# Patient Record
Sex: MALE | Race: WHITE | NOT HISPANIC OR LATINO | Employment: PART TIME | URBAN - METROPOLITAN AREA
[De-identification: names, ages, dates, MRNs, and addresses within clinical notes are randomized per-mention and may not be internally consistent; named-entity substitution may affect disease eponyms.]

---

## 2017-01-03 ENCOUNTER — DOCUMENTATION ONLY (OUTPATIENT)
Dept: TRANSPLANT | Facility: CLINIC | Age: 32
End: 2017-01-03

## 2017-01-03 ENCOUNTER — PATIENT MESSAGE (OUTPATIENT)
Dept: TRANSPLANT | Facility: CLINIC | Age: 32
End: 2017-01-03

## 2017-01-03 LAB
EXT ALBUMIN: 4
EXT BK VIRUS DNA QN PCR: NEGATIVE
EXT BUN: 19
EXT CALCIUM: 9
EXT CHLORIDE: 105
EXT CO2: 26
EXT CREATININE: 1.3 MG/DL
EXT EOSINOPHIL%: 2
EXT GFR MDRD AF AMER: 67
EXT GLUCOSE: 94
EXT HEMATOCRIT: 47.3
EXT HEMOGLOBIN: 16
EXT LYMPH%: 17.7
EXT MAGNESIUM: 2.1
EXT MONOCYTES%: 6
EXT PHOSPHORUS: 2.1 (ref 2.5–4.9)
EXT PLATELETS: 194
EXT POTASSIUM: 4.7
EXT SEGS%: 73.3
EXT SODIUM: 138 MMOL/L
EXT TACROLIMUS LVL: 7.4
EXT WBC: 7

## 2017-01-17 ENCOUNTER — DOCUMENTATION ONLY (OUTPATIENT)
Dept: TRANSPLANT | Facility: CLINIC | Age: 32
End: 2017-01-17

## 2017-01-17 LAB
EXT ALBUMIN: 4.5
EXT ANC: NORMAL
EXT BUN: 17
EXT CALCIUM: 9.2
EXT CHLORIDE: 104
EXT CO2: 27
EXT CREATININE: 1.2 MG/DL
EXT EOSINOPHIL%: 3.4
EXT GFR MDRD NON AF AMER: 73
EXT GLUCOSE UA: NORMAL
EXT GLUCOSE: 96
EXT HEMATOCRIT: 47.1
EXT HEMOGLOBIN: 15.7
EXT LYMPH%: 16.7
EXT MAGNESIUM: 2
EXT MONOCYTES%: 7.4
EXT NITRITES UA: NORMAL
EXT PHOSPHORUS: 2
EXT PLATELETS: 202
EXT POTASSIUM: 5.2
EXT PROT/CREAT RATIO UR: 0.13
EXT PROTEIN UA: NORMAL
EXT RBC UA: NORMAL
EXT SEGS%: 71.5
EXT SODIUM: 139 MMOL/L
EXT TACROLIMUS LVL: 8.3
EXT URIC ACID: 6.3
EXT WBC UA: NORMAL
EXT WBC: 7.1

## 2017-01-20 ENCOUNTER — TELEPHONE (OUTPATIENT)
Dept: TRANSPLANT | Facility: CLINIC | Age: 32
End: 2017-01-20

## 2017-01-20 ENCOUNTER — DOCUMENTATION ONLY (OUTPATIENT)
Dept: TRANSPLANT | Facility: CLINIC | Age: 32
End: 2017-01-20

## 2017-03-16 ENCOUNTER — DOCUMENTATION ONLY (OUTPATIENT)
Dept: TRANSPLANT | Facility: CLINIC | Age: 32
End: 2017-03-16

## 2017-03-16 LAB
EXT ALBUMIN: 3.8
EXT BK VIRUS DNA QUANT, PCR, URINE: ABNORMAL
EXT BUN: 16
EXT CALCIUM: 8.8
EXT CHLORIDE: 107
EXT CO2: 26
EXT CREATININE: 1.2 MG/DL
EXT EOSINOPHIL%: 6.6
EXT GFR MDRD NON AF AMER: 73
EXT GLUCOSE: 94
EXT HEMATOCRIT: 43
EXT HEMOGLOBIN: 14.4
EXT LYMPH%: 13.4
EXT MAGNESIUM: 1.9
EXT MONOCYTES%: 10
EXT PHOSPHORUS: 2
EXT PLATELETS: 176
EXT POTASSIUM: 4.8
EXT SEGS%: 68.9
EXT SODIUM: 142 MMOL/L
EXT TACROLIMUS LVL: 8.2
EXT WBC: 6.3

## 2017-03-21 ENCOUNTER — DOCUMENTATION ONLY (OUTPATIENT)
Dept: TRANSPLANT | Facility: CLINIC | Age: 32
End: 2017-03-21

## 2017-03-22 NOTE — TELEPHONE ENCOUNTER
Call placedbon reviewed by Dr. Peters. Patient reports adherence to KPN. Dose increased to 4 tabs tid. Patient states understanding.

## 2017-03-23 RX ORDER — FAMOTIDINE 20 MG/1
TABLET, FILM COATED ORAL
Qty: 30 TABLET | Refills: 5 | Status: SHIPPED | OUTPATIENT
Start: 2017-03-23

## 2017-03-24 RX ORDER — FAMOTIDINE 20 MG/1
TABLET, FILM COATED ORAL
Qty: 30 TABLET | OUTPATIENT
Start: 2017-03-24

## 2017-06-22 ENCOUNTER — DOCUMENTATION ONLY (OUTPATIENT)
Dept: TRANSPLANT | Facility: CLINIC | Age: 32
End: 2017-06-22

## 2017-06-22 ENCOUNTER — TELEPHONE (OUTPATIENT)
Dept: TRANSPLANT | Facility: CLINIC | Age: 32
End: 2017-06-22

## 2017-06-22 LAB
EXT ANC: ABNORMAL
EXT BK VIRUS DNA QN PCR: ABNORMAL
EXT BUN: 17
EXT CALCIUM: 9.4
EXT CHLORIDE: 107
EXT CO2: 28
EXT CREATININE: 1.3 MG/DL
EXT EOSINOPHIL%: 3.3
EXT GFR MDRD NON AF AMER: 67
EXT GLUCOSE: 93
EXT HEMATOCRIT: 46.6
EXT HEMOGLOBIN: 15.4
EXT LYMPH%: 17.5
EXT MAGNESIUM: 2.1
EXT MONOCYTES%: 5.7
EXT PHOSPHORUS: 1.9
EXT PLATELETS: 189
EXT POTASSIUM: 4.8
EXT PROT/CREAT RATIO UR: 0.1
EXT SEGS%: 72.6
EXT SODIUM: 142 MMOL/L
EXT TACROLIMUS LVL: 8.5
EXT URIC ACID: 7.2
EXT VIT D 25 HYDROXY: 24.8
EXT WBC: 7.5

## 2017-06-22 NOTE — TELEPHONE ENCOUNTER
Call placed, labs reviewed by Dr. Arreola. Patient reports taking Prograf twice on evening prior to lab draw. Will repeat before changing dose.

## 2017-06-22 NOTE — PROGRESS NOTES
Results reviewed, and action is needed: Prior message sent in error: please decrease tacrolimus to 2 mg twice daily. Target level for tacro is ~ 6.

## 2017-09-26 ENCOUNTER — DOCUMENTATION ONLY (OUTPATIENT)
Dept: TRANSPLANT | Facility: CLINIC | Age: 32
End: 2017-09-26

## 2017-09-26 LAB
EXT BK VIRUS DNA QN PCR: ABNORMAL
EXT BUN: 17
EXT CALCIUM: 9.3
EXT CHLORIDE: 106
EXT CO2: 27
EXT CREATININE: 1.2 MG/DL
EXT EOSINOPHIL%: 2.5
EXT GFR MDRD NON AF AMER: 73
EXT GLUCOSE: 90
EXT HEMATOCRIT: 46.6
EXT HEMOGLOBIN: 15.5
EXT LYMPH%: 17.5
EXT MAGNESIUM: 2
EXT MONOCYTES%: 5.9
EXT PHOSPHORUS: 2.1 (ref 2.5–4.9)
EXT PLATELETS: 192
EXT POTASSIUM: 4.7
EXT SEGS%: 73.1
EXT SODIUM: 138 MMOL/L
EXT TACROLIMUS LVL: 5.6
EXT WBC: 8.1

## 2017-10-02 ENCOUNTER — DOCUMENTATION ONLY (OUTPATIENT)
Dept: TRANSPLANT | Facility: CLINIC | Age: 32
End: 2017-10-02

## 2017-10-02 NOTE — PROGRESS NOTES
Let's lower MMF to 500 bid and continue with serum BK as per protocol    Add cylex every two weeks as well please

## 2017-10-03 RX ORDER — MYCOPHENOLATE MOFETIL 250 MG/1
500 CAPSULE ORAL 2 TIMES DAILY
Qty: 120 CAPSULE | Refills: 11 | Status: SHIPPED | OUTPATIENT
Start: 2017-10-03 | End: 2017-10-20 | Stop reason: SDUPTHER

## 2017-10-03 NOTE — TELEPHONE ENCOUNTER
Call placed, labs reviewed by Dr. Peters. MMF dose decreased to 500 bid. Will continue q2wk labs BK monitoring.

## 2017-10-09 ENCOUNTER — TELEPHONE (OUTPATIENT)
Dept: TRANSPLANT | Facility: CLINIC | Age: 32
End: 2017-10-09

## 2017-10-09 NOTE — TELEPHONE ENCOUNTER
----- Message from Rob Quintanilla sent at 10/9/2017  9:50 AM CDT -----  Contact: PT  Need a call this morning regarding his labs, call

## 2017-10-11 ENCOUNTER — TELEPHONE (OUTPATIENT)
Dept: TRANSPLANT | Facility: CLINIC | Age: 32
End: 2017-10-11

## 2017-10-11 NOTE — TELEPHONE ENCOUNTER
----- Message from Noelle Chambers sent at 10/11/2017  3:11 PM CDT -----  Contact: Pt  Dora    Pt states the doctor in [place] denied his lab work that was scheduled for today    Pt contact number [phone]  Thanks

## 2017-10-11 NOTE — TELEPHONE ENCOUNTER
Call placed to Dr. Ahuja office. Patient lives in [place] and lab orders must be cosigned by his Primary Nephrologist. Dr. Knott not in office today. Will complete labs when order signed.

## 2017-10-17 ENCOUNTER — DOCUMENTATION ONLY (OUTPATIENT)
Dept: TRANSPLANT | Facility: CLINIC | Age: 32
End: 2017-10-17

## 2017-10-17 LAB
EXT ALBUMIN: 4
EXT ALKALINE PHOSPHATASE: 88
EXT ALT: 24
EXT ANC: ABNORMAL
EXT AST: 13
EXT BILIRUBIN TOTAL: 0.4
EXT BK VIRUS DNA QN PCR: 470
EXT BUN: 14
EXT CALCIUM: 9.1
EXT CHLORIDE: 106
EXT CO2: 28
EXT CREATININE: 1.3 MG/DL
EXT EOSINOPHIL%: 2.6
EXT GFR MDRD NON AF AMER: 66
EXT GLUCOSE UA: ABNORMAL
EXT GLUCOSE: 94
EXT HEMATOCRIT: 47.4
EXT HEMOGLOBIN: 15.8
EXT LYMPH%: 12.2
EXT MAGNESIUM: 1.9
EXT MONOCYTES%: 6.7
EXT NITRITES UA: ABNORMAL
EXT PHOSPHORUS: 1.8
EXT PLATELETS: 212
EXT POTASSIUM: 4.9
EXT PROT/CREAT RATIO UR: 0.1
EXT PROTEIN TOTAL: 7.8
EXT PROTEIN UA: ABNORMAL
EXT SEGS%: 78.3
EXT SODIUM: 138 MMOL/L
EXT TACROLIMUS LVL: 6.6
EXT WBC: 5.44

## 2017-10-20 ENCOUNTER — DOCUMENTATION ONLY (OUTPATIENT)
Dept: TRANSPLANT | Facility: CLINIC | Age: 32
End: 2017-10-20

## 2017-10-20 NOTE — TELEPHONE ENCOUNTER
Call placed, labs reviewed by Dr. Peters. 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 Patient states understanding.

## 2017-10-20 NOTE — PROGRESS NOTES
-please lower MMF to 250 bid   Serum BK as per protocol, labs every 2 weeks for now  cylex every 2 weeks

## 2017-10-21 RX ORDER — MYCOPHENOLATE MOFETIL 250 MG/1
250 CAPSULE ORAL 2 TIMES DAILY
Qty: 60 CAPSULE | Refills: 11 | Status: SHIPPED | OUTPATIENT
Start: 2017-10-21 | End: 2018-11-08 | Stop reason: SDUPTHER

## 2017-11-06 ENCOUNTER — DOCUMENTATION ONLY (OUTPATIENT)
Dept: TRANSPLANT | Facility: CLINIC | Age: 32
End: 2017-11-06

## 2017-11-06 LAB
EXT ALBUMIN: 4.1
EXT ALKALINE PHOSPHATASE: 97
EXT ALT: 24
EXT AST: 14
EXT BILIRUBIN TOTAL: 0.5
EXT BK VIRUS DNA QN PCR: NORMAL
EXT BUN: 17
EXT CALCIUM: 9.2
EXT CHLORIDE: 105
EXT CO2: 26
EXT CREATININE: 1.2 MG/DL
EXT EOSINOPHIL%: 2.3
EXT GFR MDRD NON AF AMER: 73
EXT GLUCOSE UA: NORMAL
EXT GLUCOSE: 81
EXT HEMATOCRIT: 50
EXT HEMOGLOBIN: 16.8
EXT LYMPH%: 18.1
EXT MAGNESIUM: 1.9
EXT MONOCYTES%: 5.2
EXT NITRITES UA: NORMAL
EXT PHOSPHORUS: 1.7
EXT PLATELETS: 192
EXT POTASSIUM: 4.6
EXT PROT/CREAT RATIO UR: 0.12
EXT PROTEIN TOTAL: 8.3
EXT PROTEIN UA: NORMAL
EXT PTH, INTACT: 116
EXT SEGS%: 73.2
EXT SODIUM: 138 MMOL/L
EXT TACROLIMUS LVL: 4.5
EXT WBC UA: NORMAL
EXT WBC: 9.6

## 2017-11-17 ENCOUNTER — DOCUMENTATION ONLY (OUTPATIENT)
Dept: TRANSPLANT | Facility: CLINIC | Age: 32
End: 2017-11-17

## 2017-11-17 LAB
EXT BK VIRUS DNA QN PCR: NORMAL
EXT BUN: 19
EXT CALCIUM: 9.4
EXT CHLORIDE: 107
EXT CO2: 25
EXT CREATININE: 1.3 MG/DL
EXT GFR MDRD NON AF AMER: 66
EXT GLUCOSE: 103
EXT POTASSIUM: 4.7
EXT SODIUM: 139 MMOL/L
EXT TACROLIMUS LVL: 5.9

## 2017-11-30 RX ORDER — TACROLIMUS 1 MG/1
CAPSULE ORAL
Qty: 150 CAPSULE | Refills: 1 | Status: SHIPPED | OUTPATIENT
Start: 2017-11-30 | End: 2017-12-27 | Stop reason: SDUPTHER

## 2017-12-12 ENCOUNTER — TELEPHONE (OUTPATIENT)
Dept: TRANSPLANT | Facility: CLINIC | Age: 32
End: 2017-12-12

## 2017-12-12 NOTE — TELEPHONE ENCOUNTER
"LYLY received a message indicating that pt wanted information regarding insurance. Pt is 35 months post transplant and reports that he will be loosing his insurance at the end of the month. Pt reports wanting more information about the Marketplace and "Obamacare" Insurance and what he should look for in an insurance. Pt also reports that he is currently self-employed.    LYLY explained that if pt wanted to sign up for insurance through the Marketplace the the deadline for open enrollment is Dec. 15th for insurance to be active Jan. 1. LYLY also explained that if pt has any qualifying events like marriage, having a baby, changing/loosing jobs, pt will qualify for a special enrollment period and pt should sign up for insurance as soon as possible. Patient verbalized understanding and agreement. LYLY explained that pt should look for insurances that are accepted by his current providers in [place], accepted by [facility], and covers pt's medications and things like that. Patient verbalized understanding and agreement. LYLY also emailed a list of insurances that [facility] currently accepts to [email or web address] at pt's request.    LYLY remains available to pt, pt's family, and transplant team at [phone].    "

## 2017-12-27 ENCOUNTER — OFFICE VISIT (OUTPATIENT)
Dept: TRANSPLANT | Facility: CLINIC | Age: 32
End: 2017-12-27
Payer: MEDICARE

## 2017-12-27 ENCOUNTER — DOCUMENTATION ONLY (OUTPATIENT)
Dept: TRANSPLANT | Facility: CLINIC | Age: 32
End: 2017-12-27

## 2017-12-27 VITALS
OXYGEN SATURATION: 97 % | DIASTOLIC BLOOD PRESSURE: 81 MMHG | RESPIRATION RATE: 16 BRPM | HEIGHT: 74 IN | BODY MASS INDEX: 27.47 KG/M2 | TEMPERATURE: 98 F | WEIGHT: 214.06 LBS | HEART RATE: 78 BPM | SYSTOLIC BLOOD PRESSURE: 119 MMHG

## 2017-12-27 DIAGNOSIS — N02.B9 IGA NEPHROPATHY: Chronic | ICD-10-CM

## 2017-12-27 DIAGNOSIS — N25.81 SECONDARY HYPERPARATHYROIDISM OF RENAL ORIGIN: Chronic | ICD-10-CM

## 2017-12-27 DIAGNOSIS — N18.2 CKD (CHRONIC KIDNEY DISEASE), STAGE II: Chronic | ICD-10-CM

## 2017-12-27 DIAGNOSIS — N18.2 ANEMIA OF CHRONIC RENAL FAILURE, STAGE 2 (MILD): Chronic | ICD-10-CM

## 2017-12-27 DIAGNOSIS — B34.8 BK VIREMIA: ICD-10-CM

## 2017-12-27 DIAGNOSIS — Z94.0 KIDNEY REPLACED BY TRANSPLANT: Primary | ICD-10-CM

## 2017-12-27 DIAGNOSIS — Z29.89 NEED FOR PROPHYLACTIC IMMUNOTHERAPY: Chronic | ICD-10-CM

## 2017-12-27 DIAGNOSIS — I15.1 HYPERTENSION SECONDARY TO OTHER RENAL DISORDERS (CODE): Chronic | ICD-10-CM

## 2017-12-27 DIAGNOSIS — E83.39 HYPOPHOSPHATEMIA: ICD-10-CM

## 2017-12-27 DIAGNOSIS — D63.1 ANEMIA OF CHRONIC RENAL FAILURE, STAGE 2 (MILD): Chronic | ICD-10-CM

## 2017-12-27 DIAGNOSIS — Z94.0 S/P LIVING-DONOR KIDNEY TRANSPLANTATION: Primary | ICD-10-CM

## 2017-12-27 LAB
EXT ALBUMIN: 4.1
EXT ALKALINE PHOSPHATASE: 88
EXT ALT: 28
EXT AST: 16
EXT BILIRUBIN DIRECT: 0.1 MG/DL
EXT BILIRUBIN TOTAL: 0.5
EXT BK VIRUS DNA QN PCR: NORMAL
EXT BUN: 26
EXT CALCIUM: 9.3
EXT CHLORIDE: 106
EXT CO2: 26
EXT CREATININE: 1.6 MG/DL
EXT EOSINOPHIL%: 3.3
EXT GFR MDRD NON AF AMER: 52
EXT GLUCOSE UA: NORMAL
EXT GLUCOSE: 99
EXT HEMATOCRIT: 48.6
EXT HEMOGLOBIN: 16.3
EXT IMMUNKNOW (STIMULATED): NORMAL
EXT LYMPH%: 15.3
EXT MAGNESIUM: 2
EXT MONOCYTES%: 6.4
EXT NITRITES UA: NORMAL
EXT PHOSPHORUS: 1.9
EXT PLATELETS: 205
EXT POTASSIUM: 4.7
EXT PROT/CREAT RATIO UR: 0.08
EXT PROTEIN TOTAL: 7.9
EXT PROTEIN UA: NORMAL
EXT RBC UA: NORMAL
EXT SEGS%: 74.1
EXT SODIUM: 140 MMOL/L
EXT TACROLIMUS LVL: 7
EXT WBC UA: NORMAL
EXT WBC: 8.2

## 2017-12-27 PROCEDURE — 99214 OFFICE O/P EST MOD 30 MIN: CPT | Mod: PBBFAC | Performed by: NURSE PRACTITIONER

## 2017-12-27 PROCEDURE — 99999 PR PBB SHADOW E&M-EST. PATIENT-LVL IV: CPT | Mod: PBBFAC,,, | Performed by: NURSE PRACTITIONER

## 2017-12-27 PROCEDURE — 99214 OFFICE O/P EST MOD 30 MIN: CPT | Mod: S$PBB,,, | Performed by: NURSE PRACTITIONER

## 2017-12-27 RX ORDER — TACROLIMUS 1 MG/1
CAPSULE ORAL
Qty: 150 CAPSULE | Refills: 11 | Status: SHIPPED | OUTPATIENT
Start: 2017-12-27 | End: 2019-01-04 | Stop reason: SDUPTHER

## 2017-12-27 NOTE — LETTER
December 27, 2017        Triston Knott  [address]  [facility] [address]  Phone: [phone]  Fax: [phone]             [facility]  [address]  [facility] [address]  Phone: [phone]   Patient: Dane Brand   MR Number: [id number]   YOB: 1985   Date of Visit: 12/27/2017       Dear Dr. Triston Knott    Thank you for referring Dane Brand to me for evaluation. Attached you will find relevant portions of my assessment and plan of care.    If you have questions, please do not hesitate to call me. I look forward to following Dane Brand along with you.    Sincerely,    Ruth Stack, NP    Enclosure    If you would like to receive this communication electronically, please contact [email or web address] or [phone] to request [employer] access.    [employer] is a tool which provides read-only access to select patient information with whom you have a relationship. Its easy to use and provides real time access to review your patients record including encounter summaries, notes, results, and demographic information.    If you feel you have received this communication in error or would no longer like to receive these types of communications, please e-mail [email or web address]

## 2017-12-27 NOTE — PROGRESS NOTES
Kidney Post-Transplant Assessment    Referring Physician: Triston Knott  Current Nephrologist: Triston Knott    ORGAN: LEFT KIDNEY  Donor Type: living  PHS Increased Risk: no  Cold Ischemia: 40 mins  Induction Medications: campath - alemtuzumab (anti-cd52)    Subjective:     CC:  Reassessment of renal allograft function and management of chronic immunosuppression.    HPI:  Mr. Brand is a 32 y.o. year old White male who received a living kidney transplant on 12/17/14.  He has CKD stage 2 - GFR 60-89 and his baseline creatinine is between 1.2-1.4. He takes mycophenolate mofetil and tacrolimus for maintenance immunosuppression. He denies any recent hospitalizations or ER visits since his previous clinic visit.    Reports his nephrologist told him he will need a parathyroidectomy in the future  Follows up with general nephrologist every 3 months  Overall feels well. No health concerns today.   Denies chest pain, SOB, leg pain, abdominal pain or LUTs.      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   HENT: Negative for congestion, facial swelling, postnasal drip, rhinorrhea, sinus pressure, sore throat and trouble swallowing.    Eyes:  "Negative for pain, redness and visual disturbance.   Respiratory: Negative for cough, chest tightness, shortness of breath and wheezing.    Cardiovascular: Negative.  Negative for chest pain, palpitations and leg swelling.   Gastrointestinal: Negative for abdominal pain, diarrhea, nausea and vomiting.   Genitourinary: Negative for dysuria, flank pain and urgency.   Musculoskeletal: Negative for gait problem, neck pain and neck stiffness.   Skin: Negative for rash.   Allergic/Immunologic: Positive for immunocompromised state. Negative for environmental allergies and food allergies.   Neurological: Negative for dizziness, weakness, light-headedness and headaches.   Psychiatric/Behavioral: Negative for agitation and confusion. The patient is not nervous/anxious.        Objective:   Blood pressure 119/81, pulse 78, temperature 97.8 °F (36.6 °C), temperature source Oral, resp. rate 16, height 6' 2" (1.88 m), weight 97.1 kg (214 lb 1.1 oz), SpO2 97 %.body mass index is 27.48 kg/m².    Physical Exam   Constitutional: He is oriented to person, place, and time. He appears well-developed and well-nourished.   HENT:   Head: Normocephalic.   Mouth/Throat: Oropharynx is clear and moist. No oropharyngeal exudate.   Eyes: Conjunctivae and EOM are normal. Pupils are equal, round, and reactive to light. No scleral icterus.   Neck: Normal range of motion. Neck supple.   Cardiovascular: Normal rate, regular rhythm and normal heart sounds.    Pulmonary/Chest: Effort normal and breath sounds normal.   Abdominal: Soft. Normal appearance and bowel sounds are normal. He exhibits no distension and no mass. There is no splenomegaly or hepatomegaly. There is no tenderness. There is no rebound, no guarding, no CVA tenderness, no tenderness at McBurney's point and negative Espinoza's sign.       Musculoskeletal: Normal range of motion. He exhibits no edema.   Lymphadenopathy:     He has no cervical adenopathy.   Neurological: He is alert and " oriented to person, place, and time. He exhibits normal muscle tone. Coordination normal.   Skin: Skin is warm and dry.   Psychiatric: He has a normal mood and affect. His behavior is normal.   Vitals reviewed.      Labs:  Lab Results   Component Value Date    WBC 7.31 12/21/2015    HGB 15.3 12/21/2015    HCT 44.5 12/21/2015     12/21/2015    K 4.7 12/21/2015     12/21/2015    CO2 24 12/21/2015    BUN 21 (H) 12/21/2015    CREATININE 1.4 12/21/2015    EGFRNONAA >60.0 12/21/2015    CALCIUM 9.9 12/21/2015    PHOS 2.6 (L) 12/21/2015    MG 2.0 12/21/2015    ALBUMIN 4.1 12/21/2015    AST 25 12/18/2014    ALT 15 12/18/2014       Lab Results   Component Value Date    EXTANC  10/12/2017      Comment:      q2wk labs, BK+    EXTWBC 9.6 10/26/2017    EXTSEGS 73.2 10/26/2017    EXTPLATELETS 192 10/26/2017    EXTHEMOGLOBI 16.8 10/26/2017    EXTHEMATOCRI 50.0 10/26/2017    EXTCREATININ 1.3 11/13/2017    EXTSODIUM 139 11/13/2017    EXTPOTASSIUM 4.7 11/13/2017    EXTBUN 19 11/13/2017    EXTCO2 25 11/13/2017    EXTCALCIUM 9.4 11/13/2017    EXTPHOSPHORU 1.7 10/26/2017    EXTGLUCOSE 103 11/13/2017    EXTALBUMIN 4.1 10/26/2017    EXTAST 14 10/26/2017    EXTALT 24 10/26/2017    EXTBILITOTAL 0.5 10/26/2017       Lab Results   Component Value Date    EXTTACROLVL 5.9 11/13/2017    EXTPROTCRE 0.122 10/26/2017    EXTPTHINTACT 116 10/26/2017    EXTPROTEINUA neg 10/26/2017    EXTWBCUA neg 10/26/2017    EXTRBCUA neg 01/11/2017       Labs were reviewed with the patient.    Assessment:     1. S/P living-donor kidney transplantation 12/17/14    2. Chronic immunosuppression with Prograf and MMF    3. Hypertension secondary to other renal disorders    4. Anemia of chronic renal failure, stage 2 (mild)    5. 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 Secondary hyperparathyroidism of renal origin    7. IgA nephropathy    8. CKD (chronic kidney disease), stage II    9. Hypophosphatemia        Plan:   BK PCR . Labs Q 2 weeks  Cylex as previously requested      Chronic  Low Phos --high phos diet encouraged.   Fax labs to dialysis/nephrologist concerning  Low phos. -->MGMT deferred to general nephrology    Follow-up:   1. CKD stage: 2 stable    2. Immunosuppression:   Prograf trough 6.6, which is  therapeutic target 4-6 Continue  Prograf 3/2, MMF 250Mg BID, and Prednisone 5 mg QD. Will continue to monitor for drug toxicities    3. Allograft Function: Stable. Continue good po hydration.       EXTCREATININ 1.3 11/13/2017     EXT GFR MDRD NON AF AMER  66.0P        4. Hypertension management: advise low salt diet and home BP monitoring    No BP MEDS     5. Metabolic Bone Disease/Secondary Hyperparathyroidism:stable  Will monitor PTH, CA and Vit D/guidelines,    EXTCALCIUM 9.4 11/13/2017   EXTPHOSPHORU 1.7 10/26/2017   KPN  1000 mg TID, Sensipar 30 mg QD  Chronic  Low Phos --high phos diet encouraged.   Fax labs to dialysis/nephrologist concerning  Low phos. -->MGMT deferred to general nephrology    6. Electrolytes:  Will monitor /guidelines  EXTSODIUM 139 11/13/2017   EXTPOTASSIUM 4.7 11/13/2017   EXTBUN 19 11/13/2017   EXTCO2 25 11/13/2017     7. Anemia: stable. No need for intervention    Will monitor /guidelines     EXTWBC 9.6 10/26/2017   EXTSEGS 73.2 10/26/2017   EXTPLATELETS 192 10/26/2017   EXTHEMOGLOBI 16.8 10/26/2017   EXTHEMATOCRI 50.0 10/26/2017     8.  Cytopenias: no significant cytopenias will monitor as per our guidelines. Medicine list reviewed including potential causes of drug-induced cytopenias    9.Proteinuria: continue p/c ratio as per guidelines   EXTPROTCRE 0.122 10/26/2017        10. BK virus infection screening:  will continue to monitor/ guidelines  11/13/2017  EXT BK Virus DNA QN PCR  470         11. Weight education: provided during the clinic visit   Body mass index is 27.48 kg/m².       12.Patient safety education regarding immunosuppression including prophylaxis posttransplant for CMV, PCP : Education provided about vaccination and prevention  of infections         Follow-up:   Annual follow-up with kidney transplant clinic with repeat labs, including renal function panel with UA, urine protein/creatinine ratio, and drug trough level q3 months.  Patient also reminded to follow-up with general nephrologist.    Ruth Stack NP       Education:   Material provided to the patient.  Patient reminded to call with any health changes since these can be early signs of significant complications.  Also, I advised the patient to be sure any new medications or changes of old medications are discussed with either a pharmacist or physician knowledgeable with transplant to avoid rejection/drug toxicity related to significant drug interactions.    UNOS Patient Status  Functional Status: 80% - Normal activity with effort: some symptoms of disease  Physical Capacity: No Limitations

## 2017-12-28 ENCOUNTER — DOCUMENTATION ONLY (OUTPATIENT)
Dept: TRANSPLANT | Facility: CLINIC | Age: 32
End: 2017-12-28

## 2017-12-28 LAB
EXT ALBUMIN: 3.9
EXT ALBUMIN: 3.9
EXT ALKALINE PHOSPHATASE: 95
EXT ALKALINE PHOSPHATASE: 95
EXT ALT: 27
EXT ALT: 27
EXT AMYLASE: NORMAL
EXT ANC: NORMAL
EXT AST: 17
EXT AST: 17
EXT BACTERIA UA: NORMAL
EXT BANDS%: NORMAL
EXT BILIRUBIN DIRECT: 0.1 MG/DL
EXT BILIRUBIN DIRECT: 0.1 MG/DL
EXT BILIRUBIN TOTAL: 0.4
EXT BILIRUBIN TOTAL: 0.4
EXT BK VIRUS DNA QN PCR: NEGATIVE
EXT BK VIRUS DNA QN PCR: NORMAL
EXT BK VIRUS DNA QUANT, PCR, URINE: NORMAL
EXT BUN: 17
EXT BUN: 17
EXT C PEPTIDE: NORMAL
EXT CALCIUM: 9.2
EXT CALCIUM: 9.2
EXT CHLORIDE: 105
EXT CHLORIDE: 105
EXT CHOLESTEROL (LIPID PANEL): NORMAL
EXT CHOLESTEROL: NORMAL
EXT CMV DNA QUANT. BY PCR: NORMAL
EXT CO2: 29
EXT CO2: 29
EXT CREATININE: 1.2 MG/DL
EXT CREATININE: 1.2 MG/DL
EXT CYCLOSPORINE LVL: NORMAL
EXT EBV DNA BY PCR: NORMAL
EXT EBV DNA-COPIES/ML: NORMAL
EXT EOSINOPHIL%: 3.7
EXT EOSINOPHIL%: 3.7
EXT FERRITIN: NORMAL
EXT GFR MDRD AF AMER: NORMAL
EXT GFR MDRD NON AF AMER: 73
EXT GFR MDRD NON AF AMER: 73
EXT GLUCOSE UA: ABNORMAL
EXT GLUCOSE UA: NORMAL
EXT GLUCOSE: 95
EXT GLUCOSE: 95
EXT HBV DNA QUANT PCR: NORMAL
EXT HCV QUANT: NORMAL
EXT HDL: NORMAL
EXT HEMATOCRIT: 49.1
EXT HEMATOCRIT: 49.1
EXT HEMOGLOBIN A1C: NORMAL
EXT HEMOGLOBIN: 16.3
EXT HEMOGLOBIN: 16.3
EXT HEP B S AG: NORMAL
EXT HIV: NORMAL
EXT IMMUNKNOW (STIMULATED): ABNORMAL
EXT IMMUNKNOW (STIMULATED): NORMAL
EXT INR: NORMAL
EXT IRON SATURATION: NORMAL
EXT LDH, TOTAL: NORMAL
EXT LDL CHOLESTEROL: NORMAL
EXT LIPASE: NORMAL
EXT LYMPH%: 18
EXT LYMPH%: 18
EXT MAGNESIUM: 1.8
EXT MAGNESIUM: 1.8
EXT MONOCYTES%: 5.2
EXT MONOCYTES%: 5.2
EXT NITRITES UA: ABNORMAL
EXT NITRITES UA: NORMAL
EXT PHOSPHORUS: 1.7
EXT PHOSPHORUS: 1.7
EXT PLATELETS: 214
EXT PLATELETS: 214
EXT POTASSIUM: 4.9
EXT POTASSIUM: 4.9
EXT PROT/CREAT RATIO UR: 0.09
EXT PROT/CREAT RATIO UR: 0.09
EXT PROTEIN TOTAL: 8
EXT PROTEIN TOTAL: 8
EXT PROTEIN UA: ABNORMAL
EXT PROTEIN UA: NORMAL
EXT PT: NORMAL
EXT PTH, INTACT: NORMAL
EXT RBC UA: ABNORMAL
EXT RBC UA: NORMAL
EXT SEGS%: 72.2
EXT SEGS%: 72.2
EXT SERUM IRON: NORMAL
EXT SIROLIMUS LVL: NORMAL
EXT SODIUM: 139 MMOL/L
EXT SODIUM: 139 MMOL/L
EXT STOOL CDIFF: NORMAL
EXT STOOL CMV: NORMAL
EXT STOOL CULTURE: NORMAL
EXT STOOL OCP: NORMAL
EXT TACROLIMUS LVL: 4.5
EXT TACROLIMUS LVL: NORMAL
EXT TIBC: NORMAL
EXT TRIGLYCERIDES: NORMAL
EXT UNSATURATED IRON BINDING CAP.: NORMAL
EXT URIC ACID: NORMAL
EXT URINE CULTURE: NORMAL
EXT VIT D 25 HYDROXY: NORMAL
EXT WBC UA: ABNORMAL
EXT WBC UA: NORMAL
EXT WBC: 8.2
EXT WBC: 8.2

## 2017-12-28 NOTE — PROGRESS NOTES
I think he was seen in clinic recently. Please make sure he is taking the correct dose of KPN as ordered. Serum BK and tacrolimus level pending.

## 2017-12-29 ENCOUNTER — DOCUMENTATION ONLY (OUTPATIENT)
Dept: TRANSPLANT | Facility: CLINIC | Age: 32
End: 2017-12-29

## 2018-01-04 DIAGNOSIS — D75.1 POLYCYTHEMIA: Primary | ICD-10-CM

## 2018-01-19 ENCOUNTER — TELEPHONE (OUTPATIENT)
Dept: HEMATOLOGY/ONCOLOGY | Facility: CLINIC | Age: 33
End: 2018-01-19

## 2018-01-25 ENCOUNTER — DOCUMENTATION ONLY (OUTPATIENT)
Dept: TRANSPLANT | Facility: CLINIC | Age: 33
End: 2018-01-25

## 2018-01-25 LAB
EXT ALBUMIN: 4
EXT ALKALINE PHOSPHATASE: 89
EXT ALT: 26
EXT AST: 14
EXT BILIRUBIN DIRECT: 0.1 MG/DL
EXT BILIRUBIN TOTAL: 0.5
EXT BK VIRUS DNA QN PCR: NORMAL
EXT BUN: 26
EXT CALCIUM: 9.5
EXT CHLORIDE: 105
EXT CO2: 26
EXT CREATININE: 1.3 MG/DL
EXT EOSINOPHIL%: 3.1
EXT GFR MDRD NON AF AMER: 66
EXT GLUCOSE UA: NORMAL
EXT GLUCOSE: 90
EXT HEMATOCRIT: 48.2
EXT HEMOGLOBIN: 16
EXT LYMPH%: 18.5
EXT MAGNESIUM: 1.9
EXT MONOCYTES%: 6.3
EXT NITRITES UA: NORMAL
EXT PHOSPHORUS: 1.9
EXT PLATELETS: 216
EXT POTASSIUM: 4.7
EXT PROT/CREAT RATIO UR: 0.09
EXT PROTEIN TOTAL: 8
EXT PROTEIN UA: NORMAL
EXT SEGS%: 71.3
EXT SODIUM: 139 MMOL/L
EXT TACROLIMUS LVL: 7.7
EXT WBC: 8.1

## 2018-01-26 ENCOUNTER — TELEPHONE (OUTPATIENT)
Dept: TRANSPLANT | Facility: CLINIC | Age: 33
End: 2018-01-26

## 2018-02-08 ENCOUNTER — DOCUMENTATION ONLY (OUTPATIENT)
Dept: TRANSPLANT | Facility: CLINIC | Age: 33
End: 2018-02-08

## 2018-02-08 LAB
EXT ALBUMIN: 4
EXT ALKALINE PHOSPHATASE: 89
EXT ALT: 23
EXT ANC: NORMAL
EXT AST: 15
EXT BILIRUBIN DIRECT: 0.1 MG/DL
EXT BILIRUBIN TOTAL: 0.5
EXT BK VIRUS DNA QN PCR: NEGATIVE
EXT BUN: 13
EXT CALCIUM: 9.1
EXT CHLORIDE: 104
EXT CO2: 26
EXT CREATININE: 1.2 MG/DL
EXT EOSINOPHIL%: 2.3
EXT GFR MDRD NON AF AMER: 73
EXT GLUCOSE UA: NORMAL
EXT GLUCOSE: 92
EXT HEMATOCRIT: 45.3
EXT HEMOGLOBIN: 14.9
EXT LYMPH%: 19
EXT MAGNESIUM: 2
EXT MONOCYTES%: 5.8
EXT NITRITES UA: NORMAL
EXT PHOSPHORUS: 1.9
EXT PLATELETS: 208
EXT POTASSIUM: 4.5
EXT PROT/CREAT RATIO UR: 0.1
EXT PROTEIN TOTAL: 8.1
EXT PROTEIN UA: NORMAL
EXT SEGS%: 72.2
EXT SODIUM: 137 MMOL/L
EXT TACROLIMUS LVL: 5
EXT WBC: 7.9

## 2018-02-19 ENCOUNTER — DOCUMENTATION ONLY (OUTPATIENT)
Dept: TRANSPLANT | Facility: CLINIC | Age: 33
End: 2018-02-19

## 2018-02-22 ENCOUNTER — TELEPHONE (OUTPATIENT)
Dept: TRANSPLANT | Facility: CLINIC | Age: 33
End: 2018-02-22

## 2018-02-22 NOTE — TELEPHONE ENCOUNTER
Call placed, labs reviewed by Dr. Peters. Several negative BK levels. Will return to protocol monitoring. Lab unable to process Cylex. Patient agreeable to continue following with Dr. Knott in [place].

## 2018-11-08 RX ORDER — MYCOPHENOLATE MOFETIL 250 MG/1
CAPSULE ORAL
Qty: 60 CAPSULE | Refills: 11 | Status: SHIPPED | OUTPATIENT
Start: 2018-11-08 | End: 2019-11-15 | Stop reason: SDUPTHER

## 2019-01-04 DIAGNOSIS — Z29.89 NEED FOR PROPHYLACTIC IMMUNOTHERAPY: Chronic | ICD-10-CM

## 2019-01-04 DIAGNOSIS — Z94.0 S/P LIVING-DONOR KIDNEY TRANSPLANTATION: ICD-10-CM

## 2019-01-04 RX ORDER — TACROLIMUS 1 MG/1
CAPSULE ORAL
Qty: 150 CAPSULE | Refills: 11 | Status: SHIPPED | OUTPATIENT
Start: 2019-01-04 | End: 2020-01-15

## 2019-01-06 ENCOUNTER — DOCUMENTATION ONLY (OUTPATIENT)
Dept: TRANSPLANT | Facility: CLINIC | Age: 34
End: 2019-01-06

## 2019-03-20 ENCOUNTER — DOCUMENTATION ONLY (OUTPATIENT)
Dept: TRANSPLANT | Facility: CLINIC | Age: 34
End: 2019-03-20

## 2019-11-15 RX ORDER — MYCOPHENOLATE MOFETIL 250 MG/1
CAPSULE ORAL
Qty: 60 CAPSULE | Refills: 0 | Status: SHIPPED | OUTPATIENT
Start: 2019-11-15 | End: 2020-01-15

## 2020-01-15 DIAGNOSIS — Z29.89 NEED FOR PROPHYLACTIC IMMUNOTHERAPY: Chronic | ICD-10-CM

## 2020-01-15 DIAGNOSIS — Z94.0 S/P LIVING-DONOR KIDNEY TRANSPLANTATION: ICD-10-CM

## 2020-01-15 RX ORDER — MYCOPHENOLATE MOFETIL 250 MG/1
CAPSULE ORAL
Qty: 60 CAPSULE | Refills: 0 | Status: SHIPPED | OUTPATIENT
Start: 2020-01-15 | End: 2020-02-12

## 2020-01-15 RX ORDER — TACROLIMUS 1 MG/1
CAPSULE ORAL
Qty: 150 CAPSULE | Refills: 0 | Status: SHIPPED | OUTPATIENT
Start: 2020-01-15 | End: 2020-02-12

## 2020-02-12 DIAGNOSIS — Z94.0 S/P LIVING-DONOR KIDNEY TRANSPLANTATION: ICD-10-CM

## 2020-02-12 DIAGNOSIS — Z29.89 NEED FOR PROPHYLACTIC IMMUNOTHERAPY: Chronic | ICD-10-CM

## 2020-02-12 RX ORDER — MYCOPHENOLATE MOFETIL 250 MG/1
CAPSULE ORAL
Qty: 60 CAPSULE | Refills: 0 | Status: SHIPPED | OUTPATIENT
Start: 2020-02-12 | End: 2020-03-17

## 2020-02-12 RX ORDER — TACROLIMUS 1 MG/1
CAPSULE ORAL
Qty: 150 CAPSULE | Refills: 11 | Status: SHIPPED | OUTPATIENT
Start: 2020-02-12 | End: 2020-04-17

## 2020-03-17 DIAGNOSIS — Z94.0 S/P LIVING-DONOR KIDNEY TRANSPLANTATION: ICD-10-CM

## 2020-03-17 DIAGNOSIS — Z29.89 NEED FOR PROPHYLACTIC IMMUNOTHERAPY: Chronic | ICD-10-CM

## 2020-03-17 RX ORDER — TACROLIMUS 1 MG/1
CAPSULE ORAL
Qty: 150 CAPSULE | Refills: 0 | OUTPATIENT
Start: 2020-03-17

## 2020-03-17 RX ORDER — MYCOPHENOLATE MOFETIL 250 MG/1
CAPSULE ORAL
Qty: 60 CAPSULE | Refills: 0 | Status: SHIPPED | OUTPATIENT
Start: 2020-03-17 | End: 2020-04-17

## 2020-04-17 DIAGNOSIS — Z29.89 NEED FOR PROPHYLACTIC IMMUNOTHERAPY: Chronic | ICD-10-CM

## 2020-04-17 DIAGNOSIS — Z94.0 S/P LIVING-DONOR KIDNEY TRANSPLANTATION: ICD-10-CM

## 2020-04-17 RX ORDER — MYCOPHENOLATE MOFETIL 250 MG/1
CAPSULE ORAL
Qty: 60 CAPSULE | Refills: 0 | Status: SHIPPED | OUTPATIENT
Start: 2020-04-17 | End: 2020-06-22

## 2020-04-17 RX ORDER — TACROLIMUS 1 MG/1
CAPSULE ORAL
Qty: 150 CAPSULE | Refills: 0 | Status: SHIPPED | OUTPATIENT
Start: 2020-04-17 | End: 2020-06-22